# Patient Record
Sex: FEMALE | Race: WHITE
[De-identification: names, ages, dates, MRNs, and addresses within clinical notes are randomized per-mention and may not be internally consistent; named-entity substitution may affect disease eponyms.]

---

## 2017-03-07 ENCOUNTER — HOSPITAL ENCOUNTER (OUTPATIENT)
Dept: HOSPITAL 44 - RAD | Age: 82
End: 2017-03-07
Attending: FAMILY MEDICINE
Payer: MEDICARE

## 2017-03-07 DIAGNOSIS — R07.89: ICD-10-CM

## 2017-03-07 DIAGNOSIS — M54.9: Primary | ICD-10-CM

## 2017-03-07 PROCEDURE — 72100 X-RAY EXAM L-S SPINE 2/3 VWS: CPT

## 2017-03-07 PROCEDURE — 72072 X-RAY EXAM THORAC SPINE 3VWS: CPT

## 2017-03-07 PROCEDURE — 71110 X-RAY EXAM RIBS BIL 3 VIEWS: CPT

## 2017-03-07 NOTE — DIAGNOSTIC IMAGING REPORT
ROSANNE DUTTA 

[facility]

[address] [facility] [address] [place] [address]. [address]

 

 

 

 

Report Submission Date: Mar 7, 2017 3:20:07 PM CST

Patient       Study

Name:   RAMYA MCQUEEN       Date:   Mar 7, 2017 12:31:05 PM CST

MRN:   [id number]       Modality Type:   CR

Gender:   F       Description:   CHEST

:   37       Institution:   [facility]

Physician:   ROSANNE DUTTA

         

 

 

Bilateral ribs 



Clinical history multiple falls rib fractures 



Technique AP oblique cone down radiographs of both ribs 



Findings: No rib fractures identified. There is no pneumothorax or hemothorax. 
Bone density appears normal. Disc or atelectasis is present in the left lung 
base. There is aortic arch calcification 



Impression: No acute rib pathology in either set of ribs

 

Electronically signed on Mar 7, 2017 3:20:07 PM CST by:

Randall GORDON

## 2017-03-07 NOTE — DIAGNOSTIC IMAGING REPORT
ROSANNE DUTTA - WAYNE 

[facility]

[address] [address] [facility] [address] [place] [address]. [address]

 

 

 

 

Report Submission Date: Mar 7, 2017 3:23:15 PM CST

Patient       Study

Name:   RAMYA MCQUEEN       Date:   Mar 7, 2017 12:48:05 PM CST

MRN:   [id number]       Modality Type:   CR

Gender:   F       Description:   SPINE

:   37       Institution:   [facility]

Physician:   ROSANNE DTUTA - WAYNE

         

 

 

Lumbar spine 3 views 



Clinical history pain 



Technique AP lateral coned down lumbosacral junction 



Findings: There is disc space narrowing at multiple levels. 5 lumbar vertebrae 
are present. Vacuum cleft formation is present and multiple levels. Anterior 
subluxation of L4 on L5 is present. There is aortoiliac vascular calcification. 



Impression: Lumbar spondylosis 



Prominent vascular calcification 



Negative for fracture of the lumbar spine

 

Electronically signed on Mar 7, 2017 3:23:15 PM CST by:

Randall GORDON

## 2017-03-07 NOTE — DIAGNOSTIC IMAGING REPORT
ROSANNE DUTTA - WAYNE 

[facility]

[address] [facility] [address] [place] [address]. [address]

 

 

 

 

Report Submission Date: Mar 7, 2017 3:21:40 PM CST

Patient       Study

Name:   RAMYA MCQUEEN       Date:   Mar 7, 2017 12:46:11 PM CST

MRN:   [id number]       Modality Type:   CR

Gender:   F       Description:   SPINE

:   37       Institution:   [facility]

Physician:   ROSANNE DUTTA - WAYNE

         

 

 

Thoracic spine 



Clinical history pain 



Technique AP lateral swimmer's 



Findings: There is compression fracture of T10 Spondylosis present and multiple 
levels. No other fractures are seen. There is no paravertebral mass. 



Impression: Compression fracture T10 



Thoracic spondylosis

 

Electronically signed on Mar 7, 2017 3:21:40 PM CST by:

Randall GORDON

## 2017-11-12 ENCOUNTER — HOSPITAL ENCOUNTER (EMERGENCY)
Dept: HOSPITAL 44 - ED | Age: 82
LOS: 1 days | Discharge: HOME | End: 2017-11-13
Payer: MEDICARE

## 2017-11-12 DIAGNOSIS — N39.0: Primary | ICD-10-CM

## 2017-11-12 DIAGNOSIS — I10: ICD-10-CM

## 2017-11-12 LAB
BASOPHILS NFR BLD: 0.4 % (ref 0–1.5)
EGFR (AFRICAN): > 60
EGFR (NON-AFRICAN): > 60
EOSINOPHIL NFR BLD: 3.7 % (ref 0–6.8)
MCH RBC QN AUTO: 30.8 PG (ref 28–34)
MCV RBC AUTO: 92.7 FL (ref 80–100)
MONOCYTES %: 6.1 % (ref 0–11)
NEUTROPHILS #: 3.7 # K/UL (ref 1.4–7.7)

## 2017-11-12 PROCEDURE — 84484 ASSAY OF TROPONIN QUANT: CPT

## 2017-11-12 PROCEDURE — 96374 THER/PROPH/DIAG INJ IV PUSH: CPT

## 2017-11-12 PROCEDURE — 71010: CPT

## 2017-11-12 PROCEDURE — 87186 SC STD MICRODIL/AGAR DIL: CPT

## 2017-11-12 PROCEDURE — S1016 NON-PVC INTRAVENOUS ADMINIST: HCPCS

## 2017-11-12 PROCEDURE — 99283 EMERGENCY DEPT VISIT LOW MDM: CPT

## 2017-11-12 PROCEDURE — 80053 COMPREHEN METABOLIC PANEL: CPT

## 2017-11-12 PROCEDURE — 87086 URINE CULTURE/COLONY COUNT: CPT

## 2017-11-12 PROCEDURE — 81002 URINALYSIS NONAUTO W/O SCOPE: CPT

## 2017-11-12 PROCEDURE — 82553 CREATINE MB FRACTION: CPT

## 2017-11-12 PROCEDURE — 82550 ASSAY OF CK (CPK): CPT

## 2017-11-12 PROCEDURE — 85025 COMPLETE CBC W/AUTO DIFF WBC: CPT

## 2017-11-12 PROCEDURE — 83880 ASSAY OF NATRIURETIC PEPTIDE: CPT

## 2017-11-12 PROCEDURE — 93005 ELECTROCARDIOGRAM TRACING: CPT

## 2017-11-12 NOTE — ED PHYSICIAN DOCUMENTATION
General Adult





- HISTORIAN


Historian: patient





- HPI


Stated Complaint: high blood pressure


Chief Complaint: General Adult


Onset: hours


Timing: still present


Severity: moderate


Further Comments: yes (Pt is an 83 yo female who had markedly elevated blood 

pressure at home.  Pt felt dizzy and so checked her pressure.  On presentation 

BP = 250/96.  Pt has been taking quinapril 40 mg qd and took a second dose 

shortly pta.  Pt has not had chest pain, n/v.  Pt states that she has been a 

little more short of breath than usual lately.  Pt has not had headache or 

ankle swelling.  Pt notes increased urinary frequency.)





- ROS


CONST: other (lightheaded)


EYES/ENT: none


CVS/RESP: shortness of breath


GI/: none


MS/SKIN/LYMPH: none


NEURO/PSYCH: dizziness





- PAST HX


Past History: other (HTN, Hypothyroidism, HLD, GERD, Osteoarthritis)


Surgeries/Procedures: cholecystectomy, other (appendectomy, ortho surgery, 

carotid surgery)


Allergies/Adverse Reactions: 


 Allergies











Allergy/AdvReac Type Severity Reaction Status Date / Time


 


tramadol Allergy Intermediate Hives Verified 11/12/17 23:24


 


codeine AdvReac  Nausea/Vomi Verified 11/12/17 23:24





   ting  


 


erythromycin base AdvReac  Nausea/Vomi Verified 11/12/17 23:24





   ting  














Home Medications: 


 Ambulatory Orders











 Medication  Instructions  Recorded


 


Famotidine 40 mg PO DAILY  u2 12/29/14


 


Levothyroxine Sodium 75 mcg PO DAILY  u2 12/29/14


 


Meloxicam 15 mg PO DAILY  u2 12/29/14


 


Multivit-Min/FA/Lycopene/Lut 1 each PO DAILY  u2 12/29/14





[Centrum Silver Tablet]  


 


Quinapril HCl 40 mg PO BID  u2 12/29/14


 


Levofloxacin [Levaquin] 250 mg PO DAILY #7 tablet 11/12/17














- SOCIAL HX


Smoking History: non-smoker


Alcohol Use: none


Drug Use: none





- FAMILY HX


Family History: No





- REVIEWED ASSESSMENTS


Nursing Assessment  Reviewed: Yes


Vitals Reviewed: Yes





Progress





- Progress


Progress: 


Initial BP = 250/96





Metoprolol 5 mg IV x 1





BP improved and remained improved after several hours





/60





improvement in bp is likely due to second dose of quinipril that pt took 

shortly pta, rather than to the short acting IV metoprolol given in ER.





Will change quinipril dosing to bid and f/u with pcp.





Rx abx for UTI.





Rx Levaquin 250 mg.  Take one tablet by mouth once daily for 7 days.





- EKG/XRAY/CT


EKG: NSR (HR=81; Normal EKG.)


XRAY: chest (no acute process.)





General Adult Physical Exam





- PHYSICAL EXAM


GENERAL APPEARANCE: mild distress


EENT: pharynx normal


NECK: normal inspection, supple


RESPIRATORY: no resp distress, chest non-tender, breath sounds normal


CVS: reg rate & rhythm, heart sounds normal


ABDOMEN: soft, no organomegaly, normal bowel sounds


BACK: normal inspection, no CVA tenderness


SKIN: warm/dry, normal color


EXTREMITIES: non-tender, normal range of motion, no evidence of injury, no edema


NEURO: oriented X3, CN's nml as tested, motor nml, sensation nml





Discharge


Clincal Impression: 


HTN (hypertension)


Qualifiers:


 Hypertension type: unspecified Qualified Code(s): I10 - Essential (primary) 

hypertension





UTI (urinary tract infection)


Qualifiers:


 Urinary tract infection type: site unspecified Hematuria presence: without 

hematuria Qualified Code(s): N39.0 - Urinary tract infection, site not specified





Prescriptions: 


Levofloxacin [Levaquin] 250 mg PO DAILY #7 tablet


Referrals: 


Angelito Zurita MD [Primary Care Provider] - 2 Days


Condition: Good


Disposition: 01 HOME, SELF-CARE


Decision to Admit: NO


Decision Time: 23:49

## 2017-11-13 VITALS — SYSTOLIC BLOOD PRESSURE: 159 MMHG | DIASTOLIC BLOOD PRESSURE: 60 MMHG

## 2017-11-13 LAB
APPEARANCE UR: (no result)
COLOR,URINE: YELLOW
PH UR STRIP: 6 [PH] (ref 5–8)
RBC UR QL: (no result)
UROBILINOGEN URINE: 0.2 EU (ref 0.2–1)

## 2017-11-13 NOTE — DIAGNOSTIC IMAGING REPORT
BRET PERRY 

[facility]

[address] [facility] [address] [place] [address]. [address]

 

 

 

 

Report Submission Date: 2017 11:13:45 PM CST

Patient       Study

Name:   RAMYA MCQUEEN       Date:   2017 10:54:56 PM CST

MRN:   [id number]       Modality Type:   CR

Gender:   F       Description:   CHEST

:   37       Institution:   [facility]

Physician:   BRET PERRY

     Accession:    [id number]

 

 

Chest - one-view 

Clinical history:  Shortness of breath.  Hypotension. 

Findings:  Examination of the chest single AP upright view with comparison to 
examination of 2017 demonstrates lungs to be clear.  Cardiac silhouette 
is stable and the aorta is atherosclerotic.  Degenerative changes are seen in 
the shoulders.  Monitor leads superimpose the chest. 

Impression: 

1.  Aortic atherosclerosis. 

2.  No active disease.

 

Electronically signed on 2017 11:13:45 PM CST by:

Curly GORDON

## 2017-11-17 ENCOUNTER — HOSPITAL ENCOUNTER (EMERGENCY)
Dept: HOSPITAL 44 - ED | Age: 82
LOS: 1 days | Discharge: HOME | End: 2017-11-18
Payer: MEDICARE

## 2017-11-17 DIAGNOSIS — I10: Primary | ICD-10-CM

## 2017-11-17 PROCEDURE — 99283 EMERGENCY DEPT VISIT LOW MDM: CPT

## 2017-11-17 NOTE — ED PHYSICIAN DOCUMENTATION
General Adult





- HISTORIAN


Historian: patient





- HPI


Stated Complaint: htn


Chief Complaint: General Adult


Additional Information: 





high blood pressure


Onset: days ago (7)


Timing: still present


Severity: moderate


Further Comments: no





- ROS


CONST: no problems


EYES/ENT: none


CVS/RESP: none


GI/: none


MS/SKIN/LYMPH: none


NEURO/PSYCH: tingling





- PAST HX


Past History: hypertension


Surgeries/Procedures: other (see nurses notes)


Immunizations: referred to PCP


Allergies/Adverse Reactions: 


 Allergies











Allergy/AdvReac Type Severity Reaction Status Date / Time


 


tramadol Allergy Intermediate Hives Verified 11/17/17 22:38


 


codeine AdvReac  Nausea/Vomi Verified 11/17/17 22:38





   ting  


 


erythromycin base AdvReac  Nausea/Vomi Verified 11/17/17 22:38





   ting  














Home Medications: 


 Ambulatory Orders











 Medication  Instructions  Recorded


 


Famotidine 40 mg PO DAILY  u2 12/29/14


 


Levothyroxine Sodium 75 mcg PO DAILY  u2 12/29/14


 


Meloxicam 15 mg PO DAILY  u2 12/29/14


 


Multivit-Min/FA/Lycopene/Lut 1 each PO DAILY  u2 12/29/14





[Centrum Silver Tablet]  


 


Quinapril HCl 40 mg PO BID  u2 12/29/14


 


Levofloxacin [Levaquin] 250 mg PO DAILY #7 tablet 11/12/17














- SOCIAL HX


Smoking History: non-smoker


Alcohol Use: none


Drug Use: none





- FAMILY HX


Family History: No





- VITAL SIGNS


Vital Signs: 





 Vital Signs











Temp Pulse Resp BP Pulse Ox


 


          159/60    


 


          11/13/17 00:46   














- REVIEWED ASSESSMENTS


Nursing Assessment  Reviewed: Yes


Vitals Reviewed: Yes





Progress





- Results/Orders


Results/Orders: 





no testing ordered





- Progress


Progress: 





pt. given clonidine 0.1 mg p.o. n er, left with bp 0f 130/72, no symptoms





Critical Care Note





- Critical Care Note


Total Time (mins): 0





ED Results Lab/Radiology





- Lab Results


Lab Results: 





no testing ordered





- Radiology


Radiology Impressions: 





none ordered





- Orders


Orders: 





 ED Orders











 Category Date Time Status


 


 CloNIDine HCL [Catapress] Med  11/17/17 23:00 Discontinued





 0.1 mg PO NOW ONE   














General Adult Physical Exam





- PHYSICAL EXAM


GENERAL APPEARANCE: no distress


EENT: eye inspection normal, ENT inspection normal, pharynx normal, no signs of 

dehydration, JAJA, no nystagmus, TM's nml


NECK: normal inspection, thyroid normal, supple


RESPIRATORY: no resp distress, chest non-tender, breath sounds normal


CVS: reg rate & rhythm, heart sounds normal, equal pulses, no murmur


ABDOMEN: soft, no organomegaly, normal bowel sounds


BACK: normal inspection, no CVA tenderness


SKIN: warm/dry, normal color


EXTREMITIES: non-tender, normal range of motion, no evidence of injury, no edema


NEURO: oriented X3, CN's nml as tested, motor nml, sensation nml, mood/affect 

nml, cognition normal





Discharge


Clincal Impression: 


 Uncontrolled hypertension





Referrals: 


Angelito Zurita MD [Primary Care Provider] - 2 Days


Comments: 





Discharged home with prescription for Clonidine 0.1 mg p.o. q hs, #14


Condition: Stable


Disposition: 01 HOME, SELF-CARE


Decision to Admit: NO


Decision Time: 23:51

## 2017-11-18 VITALS — SYSTOLIC BLOOD PRESSURE: 134 MMHG | DIASTOLIC BLOOD PRESSURE: 64 MMHG

## 2017-11-23 ENCOUNTER — HOSPITAL ENCOUNTER (OUTPATIENT)
Dept: HOSPITAL 44 - ED | Age: 82
Setting detail: OBSERVATION
LOS: 2 days | Discharge: HOME | End: 2017-11-25
Attending: FAMILY MEDICINE | Admitting: FAMILY MEDICINE
Payer: MEDICARE

## 2017-11-23 VITALS — BODY MASS INDEX: 36.1 KG/M2

## 2017-11-23 DIAGNOSIS — R79.1: ICD-10-CM

## 2017-11-23 DIAGNOSIS — I10: Primary | ICD-10-CM

## 2017-11-23 LAB
BASOPHILS NFR BLD: 0.4 % (ref 0–1.5)
EGFR (AFRICAN): > 60
EGFR (NON-AFRICAN): 38
EOSINOPHIL NFR BLD: 2.9 % (ref 0–6.8)
MCH RBC QN AUTO: 30.7 PG (ref 28–34)
MCV RBC AUTO: 91.8 FL (ref 80–100)
MONOCYTES %: 6.3 % (ref 0–11)
NEUTROPHILS #: 4.1 # K/UL (ref 1.4–7.7)

## 2017-11-23 PROCEDURE — 96374 THER/PROPH/DIAG INJ IV PUSH: CPT

## 2017-11-23 PROCEDURE — 99219: CPT

## 2017-11-23 PROCEDURE — 85025 COMPLETE CBC W/AUTO DIFF WBC: CPT

## 2017-11-23 PROCEDURE — 71260 CT THORAX DX C+: CPT

## 2017-11-23 PROCEDURE — G0378 HOSPITAL OBSERVATION PER HR: HCPCS

## 2017-11-23 PROCEDURE — 81002 URINALYSIS NONAUTO W/O SCOPE: CPT

## 2017-11-23 PROCEDURE — 36415 COLL VENOUS BLD VENIPUNCTURE: CPT

## 2017-11-23 PROCEDURE — 85379 FIBRIN DEGRADATION QUANT: CPT

## 2017-11-23 PROCEDURE — S1016 NON-PVC INTRAVENOUS ADMINIST: HCPCS

## 2017-11-23 PROCEDURE — 99217: CPT

## 2017-11-23 PROCEDURE — 80048 BASIC METABOLIC PNL TOTAL CA: CPT

## 2017-11-23 PROCEDURE — 96372 THER/PROPH/DIAG INJ SC/IM: CPT

## 2017-11-23 PROCEDURE — 99284 EMERGENCY DEPT VISIT MOD MDM: CPT

## 2017-11-23 PROCEDURE — 80053 COMPREHEN METABOLIC PANEL: CPT

## 2017-11-23 PROCEDURE — 84484 ASSAY OF TROPONIN QUANT: CPT

## 2017-11-23 PROCEDURE — 96360 HYDRATION IV INFUSION INIT: CPT

## 2017-11-23 RX ADMIN — RETINOL, ERGOCALCIFEROL, .ALPHA.-TOCOPHEROL ACETATE, DL-, PHYTONADIONE, ASCORBIC ACID, NIACINAMIDE, RIBOFLAVIN 5-PHOSPHATE SODIUM, THIAMINE HYDROCHLORIDE, PYRIDOXINE HYDROCHLORIDE, DEXPANTHENOL, BIOTIN, FOLIC ACID, AND CYANOCOBALAMIN SCH MLS/HR: KIT at 23:30

## 2017-11-23 NOTE — ED PHYSICIAN DOCUMENTATION
General Adult





- HISTORIAN


Historian: patient





- HPI


Chief Complaint: General Adult


Additional Information: 





Patient has been having some problems with her BP over the last 3 weeks. Prior 

to that time she was taking Quinapril 40mg BID. Patient has been in the ED for 

elevated BP on Nov 12,17 and today. She was started on clinidine 0.1mg at 

bedtime which has helped some. She Has been followed by Dr Zurita. Was 

recently started on HCTZ 25mg daily and sertraline. Patient states that 

recently her BP has been running bout 150 systolic. She denies any chest pain/

pressure. Cantu not had any swelling in her legs. Has been having some mild SOB at 

time. No orthopnic symptoms noted. 


Timing: still present





- ROS


CONST: no problems


EYES/ENT: none


CVS/RESP: none


GI/: none


NEURO/PSYCH: other (feels flushed).  denies: headache





- PAST HX


Past History: hypertension


Other History: other (hypothyroidism)


Surgeries/Procedures: cholecystectomy, other (bladder incontenence surgery, 

bilateral TKR, left carotidendarterectomy, cholecystectomy)


Allergies/Adverse Reactions: 


 Allergies











Allergy/AdvReac Type Severity Reaction Status Date / Time


 


tramadol Allergy Intermediate Hives Verified 11/23/17 22:13


 


codeine AdvReac  Nausea/Vomi Verified 11/23/17 22:13





   ting  


 


erythromycin base AdvReac  Nausea/Vomi Verified 11/23/17 22:13





   ting  














Home Medications: 


 Ambulatory Orders











 Medication  Instructions  Recorded


 


Famotidine 40 mg PO DAILY  u2 12/29/14


 


Levothyroxine Sodium 75 mcg PO DAILY  u2 12/29/14


 


Meloxicam 15 mg PO DAILY  u2 12/29/14


 


Multivit-Min/FA/Lycopene/Lut 1 each PO DAILY  u2 12/29/14





[Centrum Silver Tablet]  


 


Quinapril HCl 40 mg PO BID  u2 12/29/14


 


CloNIDine HCL [Catapress] 0.1 mg PO HS 11/23/17


 


Hydrochlorothiazide 25 mg PO D 11/23/17


 


Sertraline HCl [Zoloft] 50 mg PO DAILY 11/23/17














- SOCIAL HX


Smoking History: quit greater than 1 year (50yo og quit)


Alcohol Use: none


Drug Use: none





- FAMILY HX


Family History: No





- VITAL SIGNS


Vital Signs: 





 Vital Signs











Temp Pulse Resp BP Pulse Ox


 


          134/64    


 


          11/18/17 00:45   














- REVIEWED ASSESSMENTS


Nursing Assessment  Reviewed: Yes


Vitals Reviewed: Yes





Progress





- Progress


Progress: 





23:18


Patient seem to be doing better with systolic dropping into the 150 then it 

went back up again. 


D Dimer is elevated. Concerned that patient might have PE but creatinine is to 

high to do CT scan. Patient is probably dehydrated some. Creatinine about 5 

days ago was 1.0. Will admit observation for hydration and then plan to get CT 

scan done to r/o PE.





General Adult Physical Exam





- PHYSICAL EXAM


GENERAL APPEARANCE: mild distress


EENT: eye inspection normal, ENT inspection normal, pharynx normal


NECK: normal inspection, thyroid normal, supple


RESPIRATORY: no resp distress, chest non-tender, breath sounds normal.  No: 

wheezes, rales, rhonchi


CVS: reg rate & rhythm, heart sounds normal, equal pulses, no murmur, no gallop


ABDOMEN: soft, no organomegaly, normal bowel sounds, no abdominal bruit, no 

distension, non-tender


SKIN: warm/dry, other (flushed facial)


EXTREMITIES: non-tender


NEURO: oriented X3, CN's nml as tested, cognition normal





Discharge


Clincal Impression: 


 Accelerated hypertension





Condition: Stable


Disposition: 09 ADMITTED AS INPATIENT


Decision to Admit: [id number]


Date of Decison to Admit: 11/23/17


Decision Time: 23:40

## 2017-11-24 LAB
APPEARANCE UR: CLEAR
COLOR,URINE: YELLOW
EGFR (AFRICAN): > 60
EGFR (NON-AFRICAN): > 60
PH UR STRIP: 5.5 [PH] (ref 5–8)
RBC UR QL: NEGATIVE
UROBILINOGEN URINE: 0.2 EU (ref 0.2–1)

## 2017-11-24 RX ADMIN — HYDROCHLOROTHIAZIDE SCH MG: 25 TABLET ORAL at 09:24

## 2017-11-24 RX ADMIN — QUINAPRIL HYDROCHLORIDE SCH MG: 20 TABLET, FILM COATED ORAL at 19:39

## 2017-11-24 RX ADMIN — HYDRALAZINE HYDROCHLORIDE SCH MG: 25 TABLET, FILM COATED ORAL at 21:43

## 2017-11-24 RX ADMIN — RETINOL, ERGOCALCIFEROL, .ALPHA.-TOCOPHEROL ACETATE, DL-, PHYTONADIONE, ASCORBIC ACID, NIACINAMIDE, RIBOFLAVIN 5-PHOSPHATE SODIUM, THIAMINE HYDROCHLORIDE, PYRIDOXINE HYDROCHLORIDE, DEXPANTHENOL, BIOTIN, FOLIC ACID, AND CYANOCOBALAMIN SCH MLS/HR: KIT at 19:39

## 2017-11-24 RX ADMIN — LEVOTHYROXINE SODIUM SCH MCG: 25 TABLET ORAL at 06:28

## 2017-11-24 RX ADMIN — PANTOPRAZOLE SODIUM SCH MG: 40 TABLET, DELAYED RELEASE ORAL at 06:28

## 2017-11-24 RX ADMIN — QUINAPRIL HYDROCHLORIDE SCH MG: 20 TABLET, FILM COATED ORAL at 06:32

## 2017-11-24 RX ADMIN — SERTRALINE HYDROCHLORIDE SCH MG: 50 TABLET ORAL at 09:24

## 2017-11-24 RX ADMIN — LEVOTHYROXINE SODIUM SCH: 25 TABLET ORAL at 02:10

## 2017-11-24 NOTE — INPATIENT PROGRESS NOTE
Subjective





- Required Recertification Statement


I anticipate X number of days because-include discharge plan: 1





- Review of Systems


Events since last encounter: 





Davida is feeling a little better today, however he blood pressure is still 

elevated.  She is not having any chest pain and denies any history of coronary 

disease.  Her troponin is negative.    I spoke to Dr Cedeño about her, and he 

recommended a CT scan of the chest due to her elevated D Dimer.  


General: Fatigue.  Denies: Chills, Night Sweats


HEENT: Denies: Head Aches, Visual Changes


Pulmonary: Dyspnea.  Denies: Pleuritic Chest Pain


Cardiovascular: Denies: Chest Pain, Palpitations, Orthopnea


Gastrointestinal: Denies: Nausea, Vomiting


Genitourinary: Denies: Dysuria


Musculoskeletal: Denies: Neck Pain, Shoulder Pain


Neurological: Weakness





Objective





- Exam


Vitals and I&O: 


 Vital Signs











Temp  97.9 F   11/24/17 08:10


 


Pulse  68   11/24/17 08:10


 


Resp  18   11/24/17 08:10


 


BP  178/84   11/24/17 08:10


 


Pulse Ox  94   11/24/17 08:10











 Intake & Output











 11/23/17 11/23/17 11/24/17





 11:59 23:59 11:59


 


Intake Total   600


 


Balance   600


 


Weight   86.636 kg


 


Intake:   


 


  Oral   600


 


Other:   


 


  Voiding Method   Toilet


 


  # Voids   1














General: Alert, Oriented to Person, Oriented to Place, Oriented to Time, 

Cooperative


HEENT: Atraumatic, PERRLA, EOMI


Neck: Supple, No JVD


Lungs: Clear to auscultation, Normal air movement, Speaks full Sentences.  No: 

Respiratory Distress, Wheezes


Cardiovascular: Regular rate, Normal S1


Abdomen: Normal bowel sounds, Soft, No tenderness


Extremities: No clubbing, No cyanosis


Skin: Normal


Neurological: Normal speech


Psych/Mental Status: Mental status NL





- Results


Results: 


 Laboratory Results











WBC  7.40 K/ul (4.00-12.00)   11/23/17  22:10    


 


RBC  3.92 M/ul (3.90-5.20)   11/23/17  22:10    


 


Hgb  12.0 g/dL (12.0-16.0)   11/23/17  22:10    


 


Hct  36.0 % (34.5-46.5)   11/23/17  22:10    


 


MCV  91.8 fl (80.0-100.0)   11/23/17  22:10    


 


MCH  30.7 pg (28.0-34.0)   11/23/17  22:10    


 


MCHC  33.4 g/dL (30.0-36.0)   11/23/17  22:10    


 


RDW  12.3 % (11.3-14.3)   11/23/17  22:10    


 


Plt Count  273 K/mm3 (130-400)   11/23/17  22:10    


 


Neut % (Auto)  56.0 % (39.0-79.0)   11/23/17  22:10    


 


Lymph % (Auto)  31.9 % (16.0-50.0)   11/23/17  22:10    


 


Mono % (Auto)  6.3 % (0.0-11.0)   11/23/17  22:10    


 


Eos % (Auto)  2.9 % (0.0-6.8)   11/23/17  22:10    


 


Baso % (Auto)  0.4  (0.0-1.5)   11/23/17  22:10    


 


Neut # (Auto)  4.1 # k/uL (1.4-7.7)   11/23/17  22:10    


 


Lymph # (Auto)  2.4 # k/uL (0.6-4.0)   11/23/17  22:10    


 


Mono # (Auto)  0.5 # k/uL (0.0-0.9)   11/23/17  22:10    


 


Eos # (Auto)  0.2 # k/uL (0.0-0.6)   11/23/17  22:10    


 


Baso # (Auto)  0.0 # k/uL (0.0-0.5)   11/23/17  22:10    


 


Reactive Lymphs %  2.5 % (0.0-5.0)   11/23/17  22:10    


 


Reactive Lymphs #  0.2 # k/uL (0.0-0.8)   11/23/17  22:10    


 


D-Dimer  1848 ng/mL (6.0-682)  H  11/23/17  22:10    


 


Sodium  138 mmol/L (136-145)   11/24/17  07:15    


 


Potassium  3.5 mmol/L (3.5-5.1)   11/24/17  07:15    


 


Chloride  104 mmol/L ()   11/24/17  07:15    


 


Carbon Dioxide  28 mmol/L (22-30)   11/24/17  07:15    


 


BUN  23 mg/dL (7-17)  H  11/24/17  07:15    


 


Creatinine  1.10 mg/dL (0.52-1.04)  H  11/24/17  07:15    


 


Estimated Creat Clear  64   11/24/17  07:15    


 


Est GFR ( Amer)  > 60  (60-)  11/24/17  07:15    


 


Est GFR (Non-Af Amer)  > 60  (60-)  11/24/17  07:15    


 


Glucose  93 mg/dL ()   11/24/17  07:15    


 


Calcium  8.6 mg/dL (8.4-10.2)   11/24/17  07:15    


 


Total Bilirubin  < 0.1 mg/dL (0.2-1.3)  L  11/23/17  22:10    


 


AST  24 U/L (15-46)   11/23/17  22:10    


 


ALT  31 U/L (13-69)   11/23/17  22:10    


 


Alkaline Phosphatase  92 U/L ()   11/23/17  22:10    


 


Troponin I  < 0.03 ng/mL (0.03-0.06)  L  11/23/17  22:10    


 


Total Protein  7.2 g/dL (6.3-8.2)   11/23/17  22:10    


 


Albumin  3.8 g/dL (3.5-5.0)   11/23/17  22:10    


 


Urine Color  Yellow  (YELLOW)   11/23/17  23:30    


 


Urine Appearance  Clear  (CLEAR)   11/23/17  23:30    


 


Urine pH  5.5  (5.0 - 8.0)   11/23/17  23:30    


 


Ur Specific Gravity  1.015  (1.010-1.030)   11/23/17  23:30    


 


Urine Protein  Negative mg/dL (NEGATIVE)   11/23/17  23:30    


 


Urine Ketones  Negative mg/dL (NEGATIVE)   11/23/17  23:30    


 


Urine Occult Blood  Negative  (NEGATIVE)   11/23/17  23:30    


 


Urine Nitrite  Negative  (NEGATIVE)   11/23/17  23:30    


 


Urine Bilirubin  Negative  (NEGATIVE)   11/23/17  23:30    


 


Urine Urobilinogen  0.2 Eu (0.2-1.0)   11/23/17  23:30    


 


Ur Leukocyte Esterase  Trace  (NEGATIVE)  H  11/23/17  23:30    


 


Urine Glucose  Negative mg/dL (NEGATIVE)   11/23/17  23:30    

















Assessment/Plan





- Assessment/Plan


(1) Accelerated hypertension


Status: Acute   Current Visit: Yes   


Assessment: 


Continue quinapril and HCTZ








(2) D-dimer, elevated


Status: Acute   Current Visit: Yes   


Assessment: 


CT scan of chest with contrast (labs reviewed and creatinine is now 1.1)

## 2017-11-24 NOTE — DIAGNOSTIC IMAGING REPORT
[facility] 

[facility]

[address] [facility] [address] [address]

 

 

 

 

Report Submission Date: 2017 9:56:16 AM CST

Patient       Study

Name:   RAMYA MCQUEEN       Date:   2017 9:38:35 AM CST

MRN:   [id number]       Modality Type:   CT\SR

Gender:   F       Description:   CT ANGIOGRAPHY CHEST 7

:   37       Institution:   [facility]

Physician:   [facility]

     Accession:    [id number]

 

 

CT chest with contrast PE protocol. 



History: HYPERTENSION, ELEVATED DDIMER 



Technique: Transaxial computed tomography images of the chest were obtained 
following the uneventful administration contrast according to CT PE protocol. 
Coronal and sagittal 3D mip images were obtained as part of the examination. 



Findings: 



The heart size is normal. The pulmonary arteries are adequately opacified and 
fail to demonstrate pulmonary embolism. The aorta is of normal course and 
caliber without evidence of aneurysm or dissection. There is mild 
atherosclerosis of the aorta . 



There is mild dependent atelectasis present within the lung base with focal 
nodular opacity in the superior segment of  the right lower lobe measuring 1.7 
cm . The no pleural effusion or pneumothorax identified. 



Limited views the upper abdomen are unremarkable. 



Degenerative present thoracic spine. 



Impression: 



1. No evidence of acute pulmonary embolism. 



2. Mild aortic atherosclerosis. 



3.  1.7 cm nodular density in the superior segment of the right lower lobe, 
correlation with previous imaging is recommended if available otherwise 
consider followup to document stability or resolution.

 

Electronically signed on 2017 9:56:16 AM CST by:

Ash GORDON

## 2017-11-25 VITALS
SYSTOLIC BLOOD PRESSURE: 190 MMHG | DIASTOLIC BLOOD PRESSURE: 87 MMHG | SYSTOLIC BLOOD PRESSURE: 190 MMHG | DIASTOLIC BLOOD PRESSURE: 87 MMHG | SYSTOLIC BLOOD PRESSURE: 190 MMHG | DIASTOLIC BLOOD PRESSURE: 87 MMHG

## 2017-11-25 RX ADMIN — QUINAPRIL HYDROCHLORIDE SCH MG: 20 TABLET, FILM COATED ORAL at 09:34

## 2017-11-25 RX ADMIN — PANTOPRAZOLE SODIUM SCH MG: 40 TABLET, DELAYED RELEASE ORAL at 06:02

## 2017-11-25 RX ADMIN — HYDRALAZINE HYDROCHLORIDE SCH: 25 TABLET, FILM COATED ORAL at 13:17

## 2017-11-25 RX ADMIN — HYDROCHLOROTHIAZIDE SCH MG: 25 TABLET ORAL at 09:33

## 2017-11-25 RX ADMIN — LEVOTHYROXINE SODIUM SCH MCG: 25 TABLET ORAL at 06:02

## 2017-11-25 RX ADMIN — HYDRALAZINE HYDROCHLORIDE SCH MG: 25 TABLET, FILM COATED ORAL at 09:34

## 2017-11-25 RX ADMIN — RETINOL, ERGOCALCIFEROL, .ALPHA.-TOCOPHEROL ACETATE, DL-, PHYTONADIONE, ASCORBIC ACID, NIACINAMIDE, RIBOFLAVIN 5-PHOSPHATE SODIUM, THIAMINE HYDROCHLORIDE, PYRIDOXINE HYDROCHLORIDE, DEXPANTHENOL, BIOTIN, FOLIC ACID, AND CYANOCOBALAMIN SCH MLS/HR: KIT at 04:53

## 2017-11-25 RX ADMIN — SERTRALINE HYDROCHLORIDE SCH MG: 50 TABLET ORAL at 09:34

## 2017-11-27 NOTE — DISCHARGE SUMMARY
DATE OF ADMISSION:

November 24, 2017



DATE OF DISCHARGE: 

November 25, 2017

 

DIAGNOSES ON THIS HOSPITALIZATION: 

1.   Accelerated hypertension.

2.   Right lower lobe mass.

3.   Elevated D-dimer.



SUMMARIZATION OF ADMISSION HISTORY AND PHYSICAL: 

This is an 82-year-old female who was seen in the emergency department multiple 
times over the last few days because of elevated blood pressures.  She came 
back in because her blood pressure continued to be elevated and she was seen by 
Dr. Cedeño and he admitted her.  



HOSPITAL COURSE:

She was admitted and initially, I added amlodipine, which failed to control her 
blood pressure very well, so I added hydralazine.  She had improved but not 
optimal control of her blood pressure at that point.



Because of her elevated D-dimer, we did do a CT of the chest with contrast to 
rule out a pulmonary embolism and there was an incidental finding of a solitary 
mass in the right lower lobe on its superior border.   We did fax a copy of 
this CT scan to Dr. Zurita's office and I will discuss it with him also on 
Monday.  She was discharged to home then. 



MEDICATIONS ON DISCHARGE: 

1.   Quinapril 40 mg p.o. b.i.d., which she has been on for quite a long time, 
with the addition of amlodipine.

2.   Amlodipine 10 mg p.o. daily. 

3.   Hydralazine 25 mg p.o. b.i.d.

4.   Hydrochlorothiazide 25 mg p.o. daily, which had been started by Dr. Zurita also. 

5.   She did resume her Zoloft 50 mg daily. 

6.   Levothyroxine 75 mcg daily. 

7.   Pantoprazole 40 mg p.o. daily. 



DISCHARGE INSTRUCTIONS: 

She will follow up on Monday morning with Dr. Zurita at 9:00 a.m. 





cc:   Dr. Zurita



[facility]

## 2018-03-14 ENCOUNTER — HOSPITAL ENCOUNTER (OUTPATIENT)
Dept: HOSPITAL 44 - LAB | Age: 83
End: 2018-03-14
Attending: INTERNAL MEDICINE
Payer: MEDICARE

## 2018-03-14 DIAGNOSIS — I10: ICD-10-CM

## 2018-03-14 DIAGNOSIS — E78.00: Primary | ICD-10-CM

## 2018-03-14 LAB
EGFR (AFRICAN): 43
EGFR (NON-AFRICAN): 35

## 2018-03-14 PROCEDURE — 80048 BASIC METABOLIC PNL TOTAL CA: CPT

## 2018-03-14 PROCEDURE — 36415 COLL VENOUS BLD VENIPUNCTURE: CPT

## 2018-03-21 ENCOUNTER — HOSPITAL ENCOUNTER (OUTPATIENT)
Dept: HOSPITAL 44 - RAD | Age: 83
End: 2018-03-21
Attending: INTERNAL MEDICINE
Payer: MEDICARE

## 2018-03-21 DIAGNOSIS — I10: Primary | ICD-10-CM

## 2018-03-21 DIAGNOSIS — I11.9: ICD-10-CM

## 2018-03-21 DIAGNOSIS — R06.02: ICD-10-CM

## 2018-03-21 PROCEDURE — 71046 X-RAY EXAM CHEST 2 VIEWS: CPT

## 2018-03-21 NOTE — DIAGNOSTIC IMAGING REPORT
Name: RAMYA MCQUEEN ~~ MRN: [id number] ~~ : 37 ~~ Acc #: [id number]~
~ DOS: Mar 21, 2018 4:16:13 PM CDT ~~ Mod: DX ~~ Desc: CHEST







 1 of 1



CHICO HALLMAN~

 [facility]

 [address] [facility].[address] [address]

~

Report Submission Date: Mar 21, 2018 5:12:04 PM CDT







Patient ~ Study  

 

Name: RAMYA MCQUEEN ~ Date: Mar 21, 2018 4:16:13 PM CDT

 

MRN: [id number] ~ Modality Type: DX

 

Gender: F ~ Description: CHEST

 

: 37 ~ Institution: [facility]

 

Physician: CHICO HALLMAN

  ~ ~Accession: ~[id number]





~

Examination: PA and lateral chest. 



History: Evaluate lung fields. Ongoing shortness of breath (Hx) 



Comparison exam: None available for direct review. 



Findings: PA lateral chest demonstrate a normal cardiac and mediastinal 
silhouette. Vascular calcifications involving the aortic arch. Mildly prominent 
interstitial pattern. Blunting of the left costophrenic margin. Articular 
degenerative changes. 



Impression: Mildly chronic interstitial changes with left costophrenic margin 
blunting. Given reported history, consider obtaining high-resolution CT chest 
to further evaluate if clinically warranted.

~

Electronically signed on Mar 21, 2018 5:12:04 PM CDT by:

Reginald GORDON